# Patient Record
Sex: FEMALE | Race: ASIAN | NOT HISPANIC OR LATINO | Employment: PART TIME | URBAN - METROPOLITAN AREA
[De-identification: names, ages, dates, MRNs, and addresses within clinical notes are randomized per-mention and may not be internally consistent; named-entity substitution may affect disease eponyms.]

---

## 2018-02-20 ENCOUNTER — OFFICE VISIT (OUTPATIENT)
Dept: FAMILY MEDICINE CLINIC | Facility: CLINIC | Age: 33
End: 2018-02-20
Payer: COMMERCIAL

## 2018-02-20 VITALS
TEMPERATURE: 98.9 F | DIASTOLIC BLOOD PRESSURE: 60 MMHG | RESPIRATION RATE: 12 BRPM | HEIGHT: 60 IN | BODY MASS INDEX: 22.15 KG/M2 | SYSTOLIC BLOOD PRESSURE: 100 MMHG | WEIGHT: 112.8 LBS | HEART RATE: 56 BPM

## 2018-02-20 DIAGNOSIS — L29.9 ITCHING: Primary | ICD-10-CM

## 2018-02-20 DIAGNOSIS — Z30.09 BIRTH CONTROL COUNSELING: ICD-10-CM

## 2018-02-20 PROCEDURE — 99203 OFFICE O/P NEW LOW 30 MIN: CPT | Performed by: FAMILY MEDICINE

## 2018-02-20 NOTE — PROGRESS NOTES
Patient is here to establish herself as a patient  Assessment/Plan:     Diagnoses and all orders for this visit:    Itching  Comments:  right breast itching  May try OTC benadryl  Refer to OBGYN  Orders:  -     Ambulatory referral to Obstetrics / Gynecology; Future    Birth control counseling  Comments:  Refer to OBGYN  Orders:  -     Ambulatory referral to Obstetrics / Gynecology; Future          Subjective:      Patient ID: Karen Vega is a 28 y o  female  HPI  Pt is here by herself to establish care  Last seen PCP was 6 months ago at [place]  Labs were normal per pt  Will get report  C/o right breast itchy for 2 month  Feels itchy inside  No rash  No swollen or redness  Denies new bra or new cloth, soap or lotion etc    [facility]  No problems  Denies Fhx of breast cancer  Menstrual period last for 10 days  Regular  Bleeding normally  No abdominal pain  [facility]UD [facility]  Would like to know if any better methods  Last GYN visit was 4 months ago  Pap done which was normal per pt  Had 3 children  Eat healthy  Exercise regularly  No smoking  No alcohol  No drugs  Denies depression  The following portions of the patient's history were reviewed and updated as appropriate: allergies, current medications, past family history, past medical history, past social history, past surgical history and problem list     Review of Systems   Constitutional: Negative for appetite change, chills and fever  HENT: Negative for congestion, ear pain, sinus pain and sore throat  Eyes: Negative for discharge and itching  Respiratory: Negative for apnea, cough, chest tightness, shortness of breath and wheezing  Cardiovascular: Negative for chest pain, palpitations and leg swelling  Gastrointestinal: Negative for abdominal pain, anal bleeding, constipation, diarrhea, nausea and vomiting     Endocrine: Negative for cold intolerance, heat intolerance and polyuria  Genitourinary: Negative for difficulty urinating and dysuria  Musculoskeletal: Negative for arthralgias, back pain and myalgias  Skin: Negative for rash  Neurological: Negative for dizziness and headaches  Psychiatric/Behavioral: Negative for agitation  Objective:      /60 (BP Location: Left arm, Patient Position: Sitting, Cuff Size: Standard)   Pulse 56   Temp 98 9 °F (37 2 °C) (Tympanic)   Resp 12   Ht 5' (1 524 m)   Wt 51 2 kg (112 lb 12 8 oz)   LMP 01/20/2018 (Approximate)   BMI 22 03 kg/m²          Physical Exam   Constitutional: She appears well-developed  No distress  HENT:   Head: Normocephalic  Right Ear: External ear normal    Left Ear: External ear normal    Nose: Nose normal    Mouth/Throat: Oropharynx is clear and moist    Eyes: Conjunctivae are normal  Pupils are equal, round, and reactive to light  Right eye exhibits no discharge  Left eye exhibits no discharge  Neck: Normal range of motion  No thyromegaly present  Cardiovascular: Normal rate, regular rhythm and normal heart sounds  Exam reveals no gallop and no friction rub  No murmur heard  Pulmonary/Chest: Effort normal and breath sounds normal  No respiratory distress  She has no wheezes  She has no rales  She exhibits no tenderness  Abdominal: Soft  Bowel sounds are normal  She exhibits no distension and no mass  There is no tenderness  There is no rebound and no guarding  Musculoskeletal: Normal range of motion  She exhibits no edema, tenderness or deformity  Lymphadenopathy:     She has no cervical adenopathy  Neurological: She is alert  Skin: No rash noted  No erythema  No pallor  Right breast no mass, no swollen or erythema  No rash   Psychiatric: She has a normal mood and affect

## 2018-03-01 ENCOUNTER — OFFICE VISIT (OUTPATIENT)
Dept: OBGYN CLINIC | Facility: CLINIC | Age: 33
End: 2018-03-01
Payer: COMMERCIAL

## 2018-03-01 VITALS
HEIGHT: 61 IN | DIASTOLIC BLOOD PRESSURE: 68 MMHG | SYSTOLIC BLOOD PRESSURE: 104 MMHG | WEIGHT: 110.8 LBS | HEART RATE: 64 BPM | BODY MASS INDEX: 20.92 KG/M2

## 2018-03-01 DIAGNOSIS — N64.59 NIPPLE SYMPTOM OR SIGN IN FEMALE: Primary | ICD-10-CM

## 2018-03-01 DIAGNOSIS — Z11.3 SCREEN FOR STD (SEXUALLY TRANSMITTED DISEASE): ICD-10-CM

## 2018-03-01 PROCEDURE — 87591 N.GONORRHOEAE DNA AMP PROB: CPT | Performed by: NURSE PRACTITIONER

## 2018-03-01 PROCEDURE — 87491 CHLMYD TRACH DNA AMP PROBE: CPT | Performed by: NURSE PRACTITIONER

## 2018-03-01 PROCEDURE — 99213 OFFICE O/P EST LOW 20 MIN: CPT | Performed by: NURSE PRACTITIONER

## 2018-03-01 NOTE — PROGRESS NOTES
Assessment/Plan:    Rt breast nipple itching, tissue may be compressed from her implants  Will check fasting prolactin, reviewed no breast stimulation , or sexual relations for 48 hours prior to lab drawn, to get done before 8 a m  Atarax 25 milligrams take 1 pill at night as needed  for itching, Disp # 20pills  Pt to RTO next Wednesday for results and F/u         Subjective:      Patient ID: Hal Mohan is a 28 y o  female  HPI Pt is 29 yo  here with complaints of right breast and nipple itching for the past 2 months  Patient feels that is very deep inside and wants to always scratch  Patient has history of having bilateral gel implants placed 3 years ago  She did follow-up with her breast surgeon 1 month ago and she states she was told that she was not related to the implant  She goes for laser treatments for her scars and the last time she went was 1 month ago and does monthly and states she has 2 more treatments on her right breast scar  She denies any nipple discharge, any rashes, any swelling of her breast or erythema  She denies any fever chills  She has a ParaGard IUD that was placed in 2012  Her menses have been lasting a little longer, sometimes up to 10 days  The following portions of the patient's history were reviewed and updated as appropriate: allergies, current medications, past family history, past medical history, past social history, past surgical history and problem list     Review of Systems   Constitutional: Negative  Respiratory: Negative  Cardiovascular: Negative  Gastrointestinal: Negative  Neurological: Negative  Objective:      /68 (BP Location: Left arm, Patient Position: Sitting, Cuff Size: Standard)   Pulse 64   Ht 5' 0 6" (1 539 m)   Wt 50 3 kg (110 lb 12 8 oz)   LMP 2018   BMI 21 21 kg/m²          Physical Exam   Constitutional: She appears well-nourished  Neck: Normal range of motion  No thyromegaly present  Cardiovascular: Normal rate and regular rhythm  Pulmonary/Chest: Effort normal and breath sounds normal    Genitourinary: Vagina normal and uterus normal      Cx- Paragard IUD in place , strings present    Breasts- bilateral breast implants, no masses, negative nipple dsch, no erythema, no skin charges

## 2018-03-04 LAB
CHLAMYDIA DNA CVX QL NAA+PROBE: NORMAL
N GONORRHOEA DNA GENITAL QL NAA+PROBE: NORMAL

## 2018-03-09 ENCOUNTER — OFFICE VISIT (OUTPATIENT)
Dept: OBGYN CLINIC | Facility: CLINIC | Age: 33
End: 2018-03-09
Payer: COMMERCIAL

## 2018-03-09 VITALS
DIASTOLIC BLOOD PRESSURE: 76 MMHG | BODY MASS INDEX: 20.43 KG/M2 | HEIGHT: 62 IN | SYSTOLIC BLOOD PRESSURE: 115 MMHG | WEIGHT: 111 LBS | HEART RATE: 63 BPM

## 2018-03-09 DIAGNOSIS — N76.0 BACTERIAL VAGINOSIS: ICD-10-CM

## 2018-03-09 DIAGNOSIS — N89.8 VAGINAL DISCHARGE: ICD-10-CM

## 2018-03-09 DIAGNOSIS — B96.89 BACTERIAL VAGINOSIS: ICD-10-CM

## 2018-03-09 DIAGNOSIS — N64.59 NIPPLE SYMPTOM OR SIGN IN FEMALE: Primary | ICD-10-CM

## 2018-03-09 LAB
BV WHIFF TEST VAG QL: ABNORMAL
CLUE CELLS SPEC QL WET PREP: POSITIVE
PH SMN: 5 [PH]
SL AMB POCT WET MOUNT: ABNORMAL
T VAGINALIS VAG QL WET PREP: ABNORMAL
YEAST VAG QL WET PREP: ABNORMAL

## 2018-03-09 PROCEDURE — 87210 SMEAR WET MOUNT SALINE/INK: CPT | Performed by: NURSE PRACTITIONER

## 2018-03-09 PROCEDURE — 99213 OFFICE O/P EST LOW 20 MIN: CPT | Performed by: NURSE PRACTITIONER

## 2018-03-09 RX ORDER — HYDRALAZINE HYDROCHLORIDE 25 MG/1
25 TABLET, FILM COATED ORAL
Refills: 0 | COMMUNITY
Start: 2018-03-01 | End: 2018-03-13

## 2018-03-09 RX ORDER — METRONIDAZOLE 7.5 MG/G
1 GEL VAGINAL DAILY
Qty: 1 G | Refills: 0 | Status: SHIPPED | OUTPATIENT
Start: 2018-03-09 | End: 2018-03-14

## 2018-03-09 NOTE — PATIENT INSTRUCTIONS
Please call for follow-up appointment after imaging  Please call if vaginal symptoms are not improved after treatment

## 2018-03-09 NOTE — PROGRESS NOTES
Assessment/Plan:         Diagnoses and all orders for this visit:    Nipple symptom or sign in female  -     Mammo diagnostic right w cad; Future    Vaginal discharge  -     POCT wet mount  -     metroNIDAZOLE (METROGEL) 0 75 % vaginal gel; Insert 1 application into the vagina daily for 5 days    Bacterial vaginosis    Other orders  -     hydrALAZINE (APRESOLINE) 25 mg tablet; Take 25 mg by mouth daily at bedtime      Pt to call for appt after imaging for appt for f/u    Subjective:      Patient ID: Ivett Clifton is a 28 y o  female  HPI  60-year-old  013 here for results of prolactin level for nipple itching for the past 2 months  Her prolactin level was normal at 11 2  She denies any nipple discharge, but she continues to have itching that feels very deep inside her nipple area  She has been on Atarax and that did help her to sleep at night  She denies any erythema breast or any kind of lumps  She does have bilateral gel implants  She does have a ParaGard IUD  Today she does report vaginal discharge and odor, she has had a BV infection in the past   Receive records from [place] and her Pap was done 2017 her Pap was negative and her HPV was negative and culture for Chlamydia and gonorrhea were both negative  She is in a monogamous relationship, she is     The following portions of the patient's history were reviewed and updated as appropriate: allergies, current medications, past family history, past medical history, past social history, past surgical history and problem list     Review of Systems   Respiratory: Negative  Genitourinary: Positive for vaginal discharge  vaginal fishy odor  Right breast nipple itching     Objective:      /76 (BP Location: Left arm, Patient Position: Sitting, Cuff Size: Adult)   Pulse 63   Ht 5' 2" (1 575 m)   Wt 50 3 kg (111 lb)   LMP 2018   Breastfeeding?  No   BMI 20 30 kg/m²          Physical Exam   Constitutional: She appears well-nourished  Cardiovascular: Normal rate and regular rhythm  Pulmonary/Chest: Effort normal and breath sounds normal    Abdominal: Soft  Genitourinary: Vagina normal and uterus normal      vagina- creamy white dsch  Cx, Paragard IUD strings present  Neg CMT  Uterus- ANSSC, NT  Adnexa- no masses, NT    Breast exam -bilateral implants, no masses, no erythema in either breast, no nipple discharge in either breast   Nipples appear normal  Wet mt/koh  Pos for Bv, negative for yeast and trich

## 2018-03-12 ENCOUNTER — HOSPITAL ENCOUNTER (OUTPATIENT)
Dept: MAMMOGRAPHY | Facility: CLINIC | Age: 33
Discharge: HOME | End: 2018-03-12
Payer: COMMERCIAL

## 2018-03-12 ENCOUNTER — TELEPHONE (OUTPATIENT)
Dept: OBGYN CLINIC | Facility: CLINIC | Age: 33
End: 2018-03-12

## 2018-03-12 ENCOUNTER — TELEPHONE (OUTPATIENT)
Dept: FAMILY MEDICINE CLINIC | Facility: CLINIC | Age: 33
End: 2018-03-12

## 2018-03-12 ENCOUNTER — HOSPITAL ENCOUNTER (OUTPATIENT)
Dept: ULTRASOUND IMAGING | Facility: CLINIC | Age: 33
Discharge: HOME | End: 2018-03-12
Payer: COMMERCIAL

## 2018-03-12 DIAGNOSIS — N64.59 NIPPLE SYMPTOM OR SIGN IN FEMALE: ICD-10-CM

## 2018-03-12 PROCEDURE — 76642 ULTRASOUND BREAST LIMITED: CPT

## 2018-03-12 PROCEDURE — 77065 DX MAMMO INCL CAD UNI: CPT

## 2018-03-12 NOTE — TELEPHONE ENCOUNTER
Pt contacted office to get mammogram results and talked with Austin Perkins RN, stated she was having itching under her arms and behind her knees  Chart review showed she was on hydralazine not the Atarax that she was prescribed for her right breast nipple itching  We contacted the pharmacy and talked with Halie King at [facility] and he reviewed records and stated it was an error by the pharmacy  He stated he will fill out form for the error  He has since contacted the patient to inform  Patient states that her itching in her  breast is still about the same, but has noticed a rash where she sweats, such in her axilla and behind her knees  Patient was informed to stop the hydralazine and was instructed to fill Atarax 25 mg 1 pill only at [facility] prescription at the pharmacy  Informed patient to follow up with her PCP, Dr Adrian Barron for her rash, possible allergic symptoms  Patient denies any SOB, dizziness, chest pain, swelling  Reviewed to go to ER if she does have symptoms  Reviewed normal MMG and US , pt does not want to make an appt for results due to copay, will review with Dr Dustin Miles and call pt back with plan and follow up

## 2018-03-13 ENCOUNTER — TELEPHONE (OUTPATIENT)
Dept: FAMILY MEDICINE CLINIC | Facility: CLINIC | Age: 33
End: 2018-03-13

## 2018-03-13 ENCOUNTER — OFFICE VISIT (OUTPATIENT)
Dept: FAMILY MEDICINE CLINIC | Facility: CLINIC | Age: 33
End: 2018-03-13
Payer: COMMERCIAL

## 2018-03-13 VITALS
DIASTOLIC BLOOD PRESSURE: 58 MMHG | SYSTOLIC BLOOD PRESSURE: 90 MMHG | HEART RATE: 60 BPM | HEIGHT: 62 IN | BODY MASS INDEX: 20.5 KG/M2 | TEMPERATURE: 98.7 F | WEIGHT: 111.4 LBS | RESPIRATION RATE: 16 BRPM

## 2018-03-13 DIAGNOSIS — Z88.9 DRUG ALLERGY: Primary | ICD-10-CM

## 2018-03-13 DIAGNOSIS — Z88.9 DRUG ALLERGY: ICD-10-CM

## 2018-03-13 DIAGNOSIS — L29.9 ITCHY SKIN: Primary | ICD-10-CM

## 2018-03-13 PROCEDURE — 99213 OFFICE O/P EST LOW 20 MIN: CPT | Performed by: FAMILY MEDICINE

## 2018-03-13 RX ORDER — HYDROXYZINE HYDROCHLORIDE 25 MG/1
25 TABLET, FILM COATED ORAL
Qty: 20 TABLET | Refills: 0 | Status: SHIPPED | OUTPATIENT
Start: 2018-03-13 | End: 2018-03-28

## 2018-03-13 RX ORDER — METHYLPREDNISOLONE 4 MG/1
TABLET ORAL
Qty: 21 TABLET | Refills: 0 | Status: SHIPPED | OUTPATIENT
Start: 2018-03-13 | End: 2018-03-13 | Stop reason: SDUPTHER

## 2018-03-13 RX ORDER — METHYLPREDNISOLONE 4 MG/1
TABLET ORAL
Qty: 21 TABLET | Refills: 0 | Status: SHIPPED | OUTPATIENT
Start: 2018-03-13 | End: 2018-03-29 | Stop reason: CLARIF

## 2018-03-13 RX ORDER — HYDROXYZINE HYDROCHLORIDE 25 MG/1
TABLET, FILM COATED ORAL
COMMUNITY
Start: 2018-03-12 | End: 2018-03-13 | Stop reason: SDUPTHER

## 2018-03-13 NOTE — PROGRESS NOTES
Patient is here for a possible allergy reaction for medication   Assessment/Plan:    Stop hydralazine  Take medrol dose pack  May use hydroxyzine at night for itchy  Call office if symptoms no improving or worse  Diagnoses and all orders for this visit:    Drug allergy  -     Methylprednisolone 4 MG TBPK; Use as directed on package    Other orders  -     hydrOXYzine HCL (ATARAX) 25 mg tablet;           Subjective:      Patient ID: Zachary Jenkins is a 28 y o  female  HPI  Pt is here by herself  C/o rash all over for 10 days  Pt saw OBGYN for right breast nipple itching  Was prescribed atarax, but pt got hydralazine from [facility]  Pt took it for 10 days  Pt states since she started hydralazine, she felt dizzy during the day  Found rash all over her body, including arms, chest, back and b/l thighs  Very itchy  Denies fever, SOB, Cp, n/v/abd pain  The following portions of the patient's history were reviewed and updated as appropriate: allergies, current medications, past family history, past medical history, past social history, past surgical history and problem list     Review of Systems   Constitutional: Negative for appetite change, chills and fever  HENT: Negative for congestion, ear pain, sinus pain and sore throat  Eyes: Negative for discharge and itching  Respiratory: Negative for apnea, cough, chest tightness, shortness of breath and wheezing  Cardiovascular: Negative for chest pain, palpitations and leg swelling  Gastrointestinal: Negative for abdominal pain, anal bleeding, constipation, diarrhea, nausea and vomiting  Endocrine: Negative for cold intolerance, heat intolerance and polyuria  Genitourinary: Negative for difficulty urinating and dysuria  Musculoskeletal: Negative for arthralgias, back pain and myalgias  Skin: Positive for rash  Neurological: Negative for dizziness and headaches  Psychiatric/Behavioral: Negative for agitation           Objective:      BP 90/58   Pulse 60   Temp 98 7 °F (37 1 °C) (Tympanic)   Resp 16   Ht 5' 2" (1 575 m)   Wt 50 5 kg (111 lb 6 4 oz)   LMP 02/22/2018   BMI 20 38 kg/m²          Physical Exam   Constitutional: She appears well-developed  No distress  HENT:   Head: Normocephalic  Right Ear: External ear normal    Left Ear: External ear normal    Nose: Nose normal    Mouth/Throat: Oropharynx is clear and moist    Eyes: Conjunctivae are normal  Pupils are equal, round, and reactive to light  Right eye exhibits no discharge  Left eye exhibits no discharge  Neck: Normal range of motion  No thyromegaly present  Cardiovascular: Normal rate, regular rhythm and normal heart sounds  Exam reveals no gallop and no friction rub  No murmur heard  Pulmonary/Chest: Effort normal and breath sounds normal  No respiratory distress  She has no wheezes  She has no rales  She exhibits no tenderness  Abdominal: Soft  Bowel sounds are normal  She exhibits no distension and no mass  There is no tenderness  There is no rebound and no guarding  Musculoskeletal: Normal range of motion  She exhibits no edema, tenderness or deformity  Lymphadenopathy:     She has no cervical adenopathy  Neurological: She is alert  Skin: Rash noted  maculopapular rash on arms, chest, back, b/l thighs, size 2-3mm, mild erythema, itchy  No tenderness  Psychiatric: She has a normal mood and affect

## 2018-03-13 NOTE — TELEPHONE ENCOUNTER
Pt called back  Supposed to take hydroxyzine, but got hydralazine  She has rash all over her body and very itchy   I will see her today at 1130am

## 2018-03-21 ENCOUNTER — TELEPHONE (OUTPATIENT)
Dept: OBGYN CLINIC | Facility: CLINIC | Age: 33
End: 2018-03-21

## 2018-03-21 NOTE — TELEPHONE ENCOUNTER
I spoke with pt, who said she is still having itching "inside" of her breast, same problem as she was having before  She said the hydroxyzine is helping with the rash that was caused by hydralazine, but that her breast itching issue is still there  I explained to pt that all of her breast imaging was negative   I d/w Ramiro Dalton- pt scheduled for appt with Dr Ashley Fajardo on 4/5/18 to discuss her issue further and for possible referral to breast specialist

## 2018-03-22 ENCOUNTER — TELEPHONE (OUTPATIENT)
Dept: OBGYN CLINIC | Facility: CLINIC | Age: 33
End: 2018-03-22

## 2018-03-22 ENCOUNTER — TELEPHONE (OUTPATIENT)
Dept: FAMILY MEDICINE CLINIC | Facility: CLINIC | Age: 33
End: 2018-03-22

## 2018-03-22 DIAGNOSIS — N64.59 NIPPLE PROBLEM: Primary | ICD-10-CM

## 2018-03-22 DIAGNOSIS — K29.50 CHRONIC GASTRITIS WITHOUT BLEEDING, UNSPECIFIED GASTRITIS TYPE: Primary | ICD-10-CM

## 2018-03-22 NOTE — TELEPHONE ENCOUNTER
Per Dr Yuliet Phillip, pt does not need appt with us on April 5  Pt needs to f/u with breast specialist Dr Ellen Ortega instead  Referral placed by Dr Yuliet Phillip and I called pt to make her aware that she needs to call and make an appt with Dr Ellen Ortega

## 2018-03-22 NOTE — TELEPHONE ENCOUNTER
I called pt back  Pt states she feels stomach ache for 1 year  Come and go  Worse after eating  Sometimes nausea, vomiting, no blood in it  Tried Omeprazole before but no help  Grandfather had stomach cancer  Would like to see Gi  I will print referral for Gi

## 2018-03-28 ENCOUNTER — OFFICE VISIT (OUTPATIENT)
Dept: SURGICAL ONCOLOGY | Facility: CLINIC | Age: 33
End: 2018-03-28
Payer: COMMERCIAL

## 2018-03-28 ENCOUNTER — TELEPHONE (OUTPATIENT)
Dept: FAMILY MEDICINE CLINIC | Facility: CLINIC | Age: 33
End: 2018-03-28

## 2018-03-28 VITALS
SYSTOLIC BLOOD PRESSURE: 102 MMHG | TEMPERATURE: 97.4 F | WEIGHT: 112 LBS | RESPIRATION RATE: 14 BRPM | BODY MASS INDEX: 20.61 KG/M2 | DIASTOLIC BLOOD PRESSURE: 70 MMHG | HEART RATE: 68 BPM | HEIGHT: 62 IN

## 2018-03-28 DIAGNOSIS — N64.59 SYMPTOMS IN BREAST: Primary | ICD-10-CM

## 2018-03-28 DIAGNOSIS — Z98.82 HISTORY OF BILATERAL BREAST IMPLANTS: ICD-10-CM

## 2018-03-28 PROCEDURE — 99243 OFF/OP CNSLTJ NEW/EST LOW 30: CPT | Performed by: SURGERY

## 2018-03-28 NOTE — PROGRESS NOTES
Surgical Oncology Follow Up       [address] [facility] [facility]  [facility]  [facility] [address]  1985  [id number]  [address] [facility] [facility]  [facility] [place]  [address] [address]      Chief Complaint:     Chief Complaint   Patient presents with    breast itching per patient for three months       Assessment and Plan:   Assessment/Plan   Right breast itching, no rash no mammographic or ultrasound abnormalities  , history of bilateral breast implants  Breast MRI    Oncology History:      No history exists  History of Present Illness: This is a 28-year-old woman who 3 years ago had bilateral breast implants, subpectoral gel filled  She has had no problems with the implants  Approximately 3 months ago she developed breast itching deep within the breast no skin changes  Some of the itching is sensed at the nipple  There is not associated with any breast signs  The patient had normal mammogram and ultrasound  She mistakenly took hydralazine instead of hydroxyzine which is caused a total-body skin rash which persist   She has treated this with steroids which did not significantly ameliorate her total body skin rash  She has a follow-up appointment with her physicians for this  She presents now for an opinion regarding further management of the persistent right breast itching  She has not appreciated any masses or changes in her breast     Review of Systems:   Review of Systems   Constitutional: Negative for activity change, appetite change, fatigue and unexpected weight change  HENT: Negative for ear pain, tinnitus, trouble swallowing and voice change  Eyes: Negative for pain and visual disturbance  Respiratory: Negative for cough, shortness of breath, wheezing and stridor  Cardiovascular: Negative for chest pain, palpitations and leg swelling     Gastrointestinal: Negative for abdominal distention, abdominal pain and blood in stool  Endocrine: Negative for cold intolerance and heat intolerance  Genitourinary: Negative for difficulty urinating, dysuria, flank pain and hematuria  Musculoskeletal: Negative for arthralgias, back pain, gait problem and joint swelling  Skin: Negative for color change, rash and wound  Allergic/Immunologic: Negative for immunocompromised state  Neurological: Negative for dizziness, seizures, speech difficulty, weakness and headaches  Hematological: Negative for adenopathy  Psychiatric/Behavioral: Negative for confusion  Past Medical History:      Patient Active Problem List   Diagnosis    PARESH (stress urinary incontinence, female)    Symptoms in breast    History of bilateral breast implants      History reviewed  No pertinent past medical history  Past Surgical History:   Procedure Laterality Date    AUGMENTATION BREAST      IMPLANTS    BREAST SURGERY      VAGINAL DELIVERY          Family History   Problem Relation Age of Onset    Hyperlipidemia Mother     No Known Problems Father     Stomach cancer Maternal Grandfather         Social History     Social History    Marital status: Single     Spouse name: N/A    Number of children: N/A    Years of education: N/A     Occupational History    Not on file       Social History Main Topics    Smoking status: Never Smoker    Smokeless tobacco: Never Used      Comment: NEVER A SMOKER AS PER ALL SCRIPTS TOO     Alcohol use No    Drug use: No    Sexual activity: Yes     Partners: Male     Birth control/ protection: IUD     Other Topics Concern    Not on file     Social History Narrative    No narrative on file        Current Outpatient Prescriptions:     Methylprednisolone 4 MG TBPK, Use as directed on package, Disp: 21 tablet, Rfl: 0     Allergies   Allergen Reactions    Hydralazine Hives    Alcohol Swabs [Isopropyl Alcohol] Itching       Physical Exam: Vitals:    03/28/18 0929   BP: 102/70   Pulse: 68   Resp: 14   Temp: (!) 97 4 °F (36 3 °C)     Physical Exam   Pulmonary/Chest:     Both breasts were examined in the sitting and supine position  There are no worrisome skin lesions, she has a faint rash on her bilateral breast which is significantly less than on her arms her abdomen consistent with the drug reaction  no nipple retraction and no nipple discharge  There are no dominant masses, axillary adenopathy or supraclavicular adenopathy on either side  Results:   Pathology:    Imaging  I reviewed her mammograms I concur with report  Discussion/Summary:   The etiology of her pruritus is unclear  There are no physical signs  She has seen her plastic surgeon who does not think this related to the implants however I have recommended an MRI to make sure, I see no evidence of malignancy though the MRI will help confirm this which would be consistent with her mammogram and ultrasound  I will see her back following her MRI  Advance Care Planning/Advance Directives:  I discussed the disease status, treatment plans and follow-up with the patient

## 2018-03-29 ENCOUNTER — OFFICE VISIT (OUTPATIENT)
Dept: FAMILY MEDICINE CLINIC | Facility: CLINIC | Age: 33
End: 2018-03-29
Payer: COMMERCIAL

## 2018-03-29 ENCOUNTER — TELEPHONE (OUTPATIENT)
Dept: FAMILY MEDICINE CLINIC | Facility: CLINIC | Age: 33
End: 2018-03-29

## 2018-03-29 VITALS
HEIGHT: 62 IN | HEART RATE: 60 BPM | SYSTOLIC BLOOD PRESSURE: 100 MMHG | TEMPERATURE: 98 F | WEIGHT: 114.6 LBS | BODY MASS INDEX: 21.09 KG/M2 | DIASTOLIC BLOOD PRESSURE: 60 MMHG | RESPIRATION RATE: 16 BRPM

## 2018-03-29 DIAGNOSIS — L23.9 ALLERGIC DERMATITIS: Primary | ICD-10-CM

## 2018-03-29 PROCEDURE — 3008F BODY MASS INDEX DOCD: CPT | Performed by: FAMILY MEDICINE

## 2018-03-29 PROCEDURE — 99213 OFFICE O/P EST LOW 20 MIN: CPT | Performed by: FAMILY MEDICINE

## 2018-03-29 RX ORDER — TRIAMCINOLONE ACETONIDE 5 MG/G
CREAM TOPICAL 3 TIMES DAILY
Qty: 30 G | Refills: 1 | Status: SHIPPED | OUTPATIENT
Start: 2018-03-29

## 2018-03-29 NOTE — PROGRESS NOTES
Patient has rash all over her body that is spreading and itchy  Assessment/Plan:    Stop oral medications  Give steroid cream    Refer to allergist and dermatology  Diagnoses and all orders for this visit:    Allergic dermatitis  -     Ambulatory referral to Allergy; Future  -     Ambulatory referral to Dermatology; Future  -     triamcinolone (KENALOG) 0 5 % cream; Apply topically 3 (three) times a day          Subjective:      Patient ID: Dixie Redd is a 28 y o  female  HPI    Pt is here by herself  C/o rash all over for 3 weeks  Pt was prescribed atarax, but pt got hyd[facility] from [facility]  Pt took it for 10 days  Since she started hydralazine, she found rash on her arms, chest, back and b/l thighs  Very itchy  Denies fever, SOB, Cp, n/v/abd pain  I gave pt medrol dose pack 2 weeks ago, pt states it did not help and it made her gaining weight  Rash is worse now, all over her body including lower legs and feet  Very itchy  The following portions of the patient's history were reviewed and updated as appropriate: allergies, current medications, past family history, past medical history, past social history, past surgical history and problem list     Review of Systems   Constitutional: Negative for appetite change, chills and fever  HENT: Negative for congestion, ear pain, sinus pain and sore throat  Eyes: Negative for discharge and itching  Respiratory: Negative for apnea, cough, chest tightness, shortness of breath and wheezing  Cardiovascular: Negative for chest pain, palpitations and leg swelling  Gastrointestinal: Negative for abdominal pain, anal bleeding, constipation, diarrhea, nausea and vomiting  Endocrine: Negative for cold intolerance, heat intolerance and polyuria  Genitourinary: Negative for difficulty urinating and dysuria  Musculoskeletal: Negative for arthralgias, back pain and myalgias  Skin: Negative for rash     Neurological: Negative for dizziness and headaches  Psychiatric/Behavioral: Negative for agitation  Objective:      /60   Pulse 60   Temp 98 °F (36 7 °C) (Tympanic)   Resp 16   Ht 5' 2" (1 575 m)   Wt 52 kg (114 lb 9 6 oz)   BMI 20 96 kg/m²          Physical Exam   Constitutional: She appears well-developed  No distress  HENT:   Head: Normocephalic  Right Ear: External ear normal    Left Ear: External ear normal    Nose: Nose normal    Mouth/Throat: Oropharynx is clear and moist    Eyes: Conjunctivae are normal  Pupils are equal, round, and reactive to light  Right eye exhibits no discharge  Left eye exhibits no discharge  Neck: Normal range of motion  No thyromegaly present  Cardiovascular: Normal rate, regular rhythm and normal heart sounds  Exam reveals no gallop and no friction rub  No murmur heard  Pulmonary/Chest: Effort normal and breath sounds normal  No respiratory distress  She has no wheezes  She has no rales  She exhibits no tenderness  Abdominal: Soft  Bowel sounds are normal  She exhibits no distension and no mass  There is no tenderness  There is no rebound and no guarding  Musculoskeletal: Normal range of motion  She exhibits no edema, tenderness or deformity  Lymphadenopathy:     She has no cervical adenopathy  Neurological: She is alert  Skin:   macularpapular lesions on arms, chest, back and legs and feet, size 3-5mm, mild erythema, no exudate   Psychiatric: She has a normal mood and affect

## 2018-03-30 ENCOUNTER — LAB REQUISITION (OUTPATIENT)
Dept: LAB | Facility: HOSPITAL | Age: 33
End: 2018-03-30
Payer: COMMERCIAL

## 2018-03-30 DIAGNOSIS — R93.3 ABNORMAL FINDINGS ON DIAGNOSTIC IMAGING OF OTHER PARTS OF DIGESTIVE TRACT: ICD-10-CM

## 2018-03-30 DIAGNOSIS — R10.13 EPIGASTRIC PAIN: ICD-10-CM

## 2018-03-30 DIAGNOSIS — Z80.0 FAMILY HISTORY OF MALIGNANT NEOPLASM OF DIGESTIVE ORGAN: ICD-10-CM

## 2018-03-30 PROCEDURE — 88305 TISSUE EXAM BY PATHOLOGIST: CPT | Performed by: PATHOLOGY

## 2018-03-30 PROCEDURE — 88342 IMHCHEM/IMCYTCHM 1ST ANTB: CPT | Performed by: PATHOLOGY

## 2018-04-23 ENCOUNTER — HOSPITAL ENCOUNTER (OUTPATIENT)
Dept: RADIOLOGY | Facility: HOSPITAL | Age: 33
Discharge: HOME | End: 2018-04-23
Attending: SURGERY
Payer: COMMERCIAL

## 2018-04-23 DIAGNOSIS — Z98.82 HISTORY OF BILATERAL BREAST IMPLANTS: ICD-10-CM

## 2018-04-23 DIAGNOSIS — N64.59 SYMPTOMS IN BREAST: ICD-10-CM

## 2018-04-23 PROCEDURE — 0159T HB CAD BREAST MRI: CPT

## 2018-04-23 PROCEDURE — A9585 GADOBUTROL INJECTION: HCPCS | Performed by: SURGERY

## 2018-04-23 PROCEDURE — C8908 MRI W/O FOL W/CONT, BREAST,: HCPCS

## 2018-04-23 RX ADMIN — GADOBUTROL 5 ML: 604.72 INJECTION INTRAVENOUS at 15:28

## 2018-04-26 DIAGNOSIS — R92.8 ABNORMAL FINDING ON BREAST IMAGING: Primary | ICD-10-CM

## 2018-04-26 NOTE — PROGRESS NOTES
On 4/26/18, I Notified Rose Stanley, Dr Tyler Barclay nurse, of results and recommedation for Ms  Michele Fiore as a result of her breast MRI

## 2018-05-02 LAB
CHLAMYDIA,AMPLIFIED DNA PROBE (HISTORICAL): NEGATIVE
N GONORRHOEAE, AMPLIFIED DNA (HISTORICAL): NEGATIVE

## 2018-05-07 ENCOUNTER — TELEPHONE (OUTPATIENT)
Dept: SURGICAL ONCOLOGY | Facility: CLINIC | Age: 33
End: 2018-05-07

## 2018-05-17 ENCOUNTER — TELEPHONE (OUTPATIENT)
Dept: SURGICAL ONCOLOGY | Facility: CLINIC | Age: 33
End: 2018-05-17

## 2018-05-17 NOTE — TELEPHONE ENCOUNTER
Second attempt to reach patient after leaving messages regarding her MRI results  Results reviewed with patient along with recommendation for a second look ultrasound  Message sent to [facility] via [employer] for her to contact the patient to schedule

## 2018-05-18 ENCOUNTER — HOSPITAL ENCOUNTER (OUTPATIENT)
Dept: ULTRASOUND IMAGING | Facility: CLINIC | Age: 33
Discharge: HOME | End: 2018-05-18
Payer: COMMERCIAL

## 2018-05-18 DIAGNOSIS — R92.8 ABNORMAL FINDING ON BREAST IMAGING: ICD-10-CM

## 2018-05-18 PROCEDURE — 76642 ULTRASOUND BREAST LIMITED: CPT

## 2018-08-22 ENCOUNTER — OFFICE VISIT (OUTPATIENT)
Dept: FAMILY MEDICINE CLINIC | Facility: CLINIC | Age: 33
End: 2018-08-22
Payer: COMMERCIAL

## 2018-08-22 VITALS
WEIGHT: 114.4 LBS | DIASTOLIC BLOOD PRESSURE: 68 MMHG | RESPIRATION RATE: 16 BRPM | HEART RATE: 58 BPM | HEIGHT: 61 IN | TEMPERATURE: 98.6 F | BODY MASS INDEX: 21.6 KG/M2 | SYSTOLIC BLOOD PRESSURE: 98 MMHG | OXYGEN SATURATION: 99 %

## 2018-08-22 DIAGNOSIS — Z00.00 ENCOUNTER FOR PREVENTIVE CARE: Primary | ICD-10-CM

## 2018-08-22 PROCEDURE — 99395 PREV VISIT EST AGE 18-39: CPT | Performed by: FAMILY MEDICINE

## 2018-08-22 NOTE — PROGRESS NOTES
Chief Complaint   Patient presents with    Physical Exam     Annual Physical     Assessment/Plan:         Diagnoses and all orders for this visit:    Encounter for preventive care          Subjective:      Patient ID: Patsy Gonzales is a 35 y o  female  Assessment/Plan    Healthy female exam      1  Dermatitis---may use triamcinolone cream    2  Patient Counseling:  --Nutrition: Stressed importance of moderation in sodium/caffeine intake, saturated fat and cholesterol, caloric balance, sufficient intake of fresh fruits, vegetables, fiber, calcium, iron, and 1 mg of folate supplement per day (for females capable of pregnancy)  --Exercise: Stressed the importance of regular exercise  --Sexuality: Discussed sexually transmitted diseases, partner selection, use of condoms, avoidance of unintended pregnancy  and contraceptive alternatives  --Injury prevention: Discussed safety belts, safety helmets, smoke detector, smoking near bedding or upholstery  --Dental health: Discussed importance of regular tooth brushing, flossing, and dental visits  --Immunizations reviewed  Will check insurance for coverage of flu shot  --Discussed benefits of screening colonoscopy  Not indicated  3  Discussed the patient's BMI with her  The BMI is in the acceptable range  4  Follow up in one year  Subjective    Patsy Gonzales is a 35 y o  female and is here for a comprehensive physical exam  The patient reports   C/o rash on thighs for 1 week  Start when she sits in her car for long trip  She tried allergy pills which helped little  Denies fever, SOB, CP, n/v/abd pain  Eat healthy  Exercise regularly  No smoking, alcohol or drugs  [facility] dentist regularly  No vision/hearing problems  Denies depression/anxiety  Do you take any herbs or supplements that were not prescribed by a doctor? no  Are you taking calcium supplements? no  Are you taking aspirin daily? no     History:  [facility] regularly       The following portions of the patient's history were reviewed and updated as appropriate: allergies, current medications, past family history, past medical history, past social history, past surgical history and problem list     Review of Systems  Do you have pain that bothers you in your daily life? no  Pertinent items are noted in HPI       Objective    BP 98/68 (BP Location: Left arm, Patient Position: Sitting, Cuff Size: Adult)   Pulse 58   Temp 98 6 °F (37 °C) (Oral)   Resp 16   Ht 5' 1" (1 549 m)   Wt 51 9 kg (114 lb 6 4 oz)   SpO2 99%   BMI 21 62 kg/m²     General Appearance:    Alert, cooperative, no distress, appears stated age  Head:    Normocephalic, without obvious abnormality, atraumatic  Eyes:    PERRL, conjunctiva/corneas clear, EOM's intact, fundi     benign, both eyes  Ears:    Normal TM's and external ear canals, both ears  Nose:   Nares normal, septum midline, mucosa normal, no drainage    or sinus tenderness  Throat:   Lips, mucosa, and tongue normal; teeth and gums normal  Neck:   Supple, symmetrical, trachea midline, no adenopathy;     thyroid:  no enlargement/tenderness/nodules; no carotid    bruit or JVD  Lungs:     Clear to auscultation bilaterally, respirations unlabored   Heart:    Regular rate and rhythm, S1 and S2 normal, no murmur, rub   or gallop  Abdomen:     Soft, non-tender, bowel sounds active all four quadrants,     no masses, no organomegaly  Extremities:   Extremities normal, atraumatic, no cyanosis or edema  C/o macular lesions on thighs  The following portions of the patient's history were reviewed and updated as appropriate: allergies, current medications, past family history, past medical history, past social history, past surgical history and problem list     Review of Systems   Constitutional: Negative for appetite change, chills and fever  HENT: Negative for congestion, ear pain, sinus pain and sore throat  Eyes: Negative for discharge and itching  Respiratory: Negative for apnea, cough, chest tightness, shortness of breath and wheezing  Cardiovascular: Negative for chest pain, palpitations and leg swelling  Gastrointestinal: Negative for abdominal pain, anal bleeding, constipation, diarrhea, nausea and vomiting  Endocrine: Negative for cold intolerance, heat intolerance and polyuria  Genitourinary: Negative for difficulty urinating and dysuria  Musculoskeletal: Negative for arthralgias, back pain and myalgias  Skin: Positive for rash  Neurological: Negative for dizziness and headaches  Psychiatric/Behavioral: Negative for agitation           Objective:      BP 98/68 (BP Location: Left arm, Patient Position: Sitting, Cuff Size: Adult)   Pulse 58   Temp 98 6 °F (37 °C) (Oral)   Resp 16   Ht 5' 1" (1 549 m)   Wt 51 9 kg (114 lb 6 4 oz)   SpO2 99%   BMI 21 62 kg/m²          Physical Exam

## 2021-03-29 ENCOUNTER — IMMUNIZATIONS (OUTPATIENT)
Dept: FAMILY MEDICINE CLINIC | Facility: HOSPITAL | Age: 36
End: 2021-03-29

## 2021-03-29 DIAGNOSIS — Z23 ENCOUNTER FOR IMMUNIZATION: Primary | ICD-10-CM

## 2021-03-29 PROCEDURE — 0001A SARS-COV-2 / COVID-19 MRNA VACCINE (PFIZER-BIONTECH) 30 MCG: CPT

## 2021-03-29 PROCEDURE — 91300 SARS-COV-2 / COVID-19 MRNA VACCINE (PFIZER-BIONTECH) 30 MCG: CPT

## 2021-04-22 ENCOUNTER — IMMUNIZATIONS (OUTPATIENT)
Dept: FAMILY MEDICINE CLINIC | Facility: HOSPITAL | Age: 36
End: 2021-04-22

## 2021-04-22 DIAGNOSIS — Z23 ENCOUNTER FOR IMMUNIZATION: Primary | ICD-10-CM

## 2021-04-22 PROCEDURE — 0002A SARS-COV-2 / COVID-19 MRNA VACCINE (PFIZER-BIONTECH) 30 MCG: CPT

## 2021-04-22 PROCEDURE — 91300 SARS-COV-2 / COVID-19 MRNA VACCINE (PFIZER-BIONTECH) 30 MCG: CPT

## 2022-01-08 DIAGNOSIS — Z11.59 SCREENING FOR VIRAL DISEASE: Primary | ICD-10-CM

## 2022-01-08 PROCEDURE — U0005 INFEC AGEN DETEC AMPLI PROBE: HCPCS | Performed by: FAMILY MEDICINE

## 2022-01-08 PROCEDURE — U0003 INFECTIOUS AGENT DETECTION BY NUCLEIC ACID (DNA OR RNA); SEVERE ACUTE RESPIRATORY SYNDROME CORONAVIRUS 2 (SARS-COV-2) (CORONAVIRUS DISEASE [COVID-19]), AMPLIFIED PROBE TECHNIQUE, MAKING USE OF HIGH THROUGHPUT TECHNOLOGIES AS DESCRIBED BY CMS-2020-01-R: HCPCS | Performed by: FAMILY MEDICINE
